# Patient Record
Sex: MALE | Race: WHITE | Employment: UNEMPLOYED | URBAN - NONMETROPOLITAN AREA
[De-identification: names, ages, dates, MRNs, and addresses within clinical notes are randomized per-mention and may not be internally consistent; named-entity substitution may affect disease eponyms.]

---

## 2024-02-19 ENCOUNTER — OFFICE VISIT (OUTPATIENT)
Dept: URGENT CARE | Facility: CLINIC | Age: 24
End: 2024-02-19
Payer: COMMERCIAL

## 2024-02-19 VITALS
DIASTOLIC BLOOD PRESSURE: 68 MMHG | SYSTOLIC BLOOD PRESSURE: 138 MMHG | TEMPERATURE: 98.3 F | HEIGHT: 72 IN | HEART RATE: 107 BPM | RESPIRATION RATE: 16 BRPM | OXYGEN SATURATION: 98 % | BODY MASS INDEX: 24.11 KG/M2 | WEIGHT: 178 LBS

## 2024-02-19 DIAGNOSIS — Z02.4 DRIVER'S PERMIT PHYSICAL EXAMINATION: Primary | ICD-10-CM

## 2024-02-19 NOTE — PROGRESS NOTES
[facility]        NAME: Audi Beckford is a 23 y.o. male  : 2000    MRN: [id number]  DATE: 2024  TIME: 11:52 AM    Assessment and Plan   's permit physical examination [Z02.4]  1. 's permit physical examination              Patient Instructions       Follow up with PCP as recommended.     Chief Complaint     Chief Complaint   Patient presents with    Annual Exam     's Learner's Permit         History of Present Illness       Presents for drivers permit physical.  Denies pertinent past medical history or medications.        Review of Systems   Review of Systems   Eyes:  Negative for visual disturbance.   Respiratory:  Negative for shortness of breath.    Cardiovascular:  Negative for chest pain and palpitations.   Musculoskeletal:  Negative for neck stiffness.   Neurological:  Negative for dizziness and seizures.         Current Medications     No current outpatient medications on file.    Current Allergies     Allergies as of 2024    (No Known Allergies)            The following portions of the patient's history were reviewed and updated as appropriate: allergies, current medications, past family history, past medical history, past social history, past surgical history and problem list.     History reviewed. No pertinent past medical history.    History reviewed. No pertinent surgical history.    History reviewed. No pertinent family history.      Medications have been verified.        Objective   /68   Pulse (!) 107   Temp 98.3 °F (36.8 °C)   Resp 16   Ht 6' (1.829 m)   Wt 80.7 kg (178 lb)   SpO2 98%   BMI 24.14 kg/m²   No LMP for male patient.       Physical Exam     Physical Exam  Vitals reviewed.   Constitutional:       Appearance: He is well-developed.   HENT:      Head: Normocephalic and atraumatic.   Eyes:      Extraocular Movements: Extraocular movements intact.      Pupils: Pupils are equal, round, and reactive to light.   Cardiovascular:       Rate and Rhythm: Normal rate and regular rhythm.      Heart sounds: Normal heart sounds. No murmur heard.     No friction rub. No gallop.   Pulmonary:      Effort: Pulmonary effort is normal. No respiratory distress.      Breath sounds: Normal breath sounds. No wheezing, rhonchi or rales.   Musculoskeletal:         General: Normal range of motion.      Cervical back: Normal range of motion.   Skin:     General: Skin is warm.   Neurological:      Mental Status: He is alert and oriented to person, place, and time.      Cranial Nerves: No cranial nerve deficit.      Sensory: No sensory deficit.      Motor: No weakness.      Coordination: Coordination normal.      Gait: Gait normal.      Deep Tendon Reflexes: Reflexes are normal and symmetric.   Psychiatric:         Behavior: Behavior normal.         Thought Content: Thought content normal.         Judgment: Judgment normal.